# Patient Record
Sex: MALE | Race: WHITE | NOT HISPANIC OR LATINO | Employment: FULL TIME | URBAN - METROPOLITAN AREA
[De-identification: names, ages, dates, MRNs, and addresses within clinical notes are randomized per-mention and may not be internally consistent; named-entity substitution may affect disease eponyms.]

---

## 2017-09-26 ENCOUNTER — OFFICE VISIT (OUTPATIENT)
Dept: URGENT CARE | Facility: URGENT CARE | Age: 52
End: 2017-09-26
Payer: COMMERCIAL

## 2017-09-26 VITALS
WEIGHT: 228.1 LBS | BODY MASS INDEX: 30.09 KG/M2 | OXYGEN SATURATION: 98 % | TEMPERATURE: 98.2 F | DIASTOLIC BLOOD PRESSURE: 80 MMHG | SYSTOLIC BLOOD PRESSURE: 119 MMHG | HEART RATE: 56 BPM

## 2017-09-26 DIAGNOSIS — L03.90 CELLULITIS, UNSPECIFIED CELLULITIS SITE: ICD-10-CM

## 2017-09-26 DIAGNOSIS — H00.022 HORDEOLUM INTERNUM OF RIGHT LOWER EYELID: Primary | ICD-10-CM

## 2017-09-26 PROCEDURE — 99213 OFFICE O/P EST LOW 20 MIN: CPT | Performed by: PHYSICIAN ASSISTANT

## 2017-09-26 RX ORDER — CEFDINIR 300 MG/1
300 CAPSULE ORAL 2 TIMES DAILY
Qty: 14 CAPSULE | Refills: 0 | Status: SHIPPED | OUTPATIENT
Start: 2017-09-26

## 2017-09-26 RX ORDER — TOBRAMYCIN 3 MG/ML
1 SOLUTION/ DROPS OPHTHALMIC EVERY 4 HOURS
Qty: 1 BOTTLE | Refills: 0 | Status: SHIPPED | OUTPATIENT
Start: 2017-09-26 | End: 2017-10-03

## 2017-09-26 NOTE — MR AVS SNAPSHOT
After Visit Summary   9/26/2017    Jona Belle Jr.    MRN: [id number]           Patient Information     Date Of Birth          1965        Visit Information        Provider Department      9/26/2017 11:20 AM Freddy Diop PA-C [facility]        Today's Diagnoses     Hordeolum internum of right lower eyelid    -  1    Cellulitis, unspecified cellulitis site           Follow-ups after your visit        Who to contact     If you have questions or need follow up information about today's clinic visit or your schedule please contact [facility] directly at [phone].  Normal or non-critical lab and imaging results will be communicated to you by MyChart, letter or phone within 4 business days after the clinic has received the results. If you do not hear from us within 7 days, please contact the clinic through [employer] or phone. If you have a critical or abnormal lab result, we will notify you by phone as soon as possible.  Submit refill requests through [employer] or call your pharmacy and they will forward the refill request to us. Please allow 3 business days for your refill to be completed.          Additional Information About Your Visit        MyChart Information     [employer] gives you secure access to your electronic health record. If you see a primary care provider, you can also send messages to your care team and make appointments. If you have questions, please call your primary care clinic.  If you do not have a primary care provider, please call [phone] and they will assist you.        Care EveryWhere ID     This is your Care EveryWhere ID. This could be used by other organizations to access your [facility] medical records  [id number]        Your Vitals Were     Pulse Temperature Pulse Oximetry BMI (Body Mass Index)          56 98.2  F (36.8  C) (Oral) 98% 30.09 kg/m2         Blood Pressure from Last 3 Encounters:   09/26/17  Pre-procedure teaching reinforced. 119/80   12/30/16 118/62   09/20/16 123/81    Weight from Last 3 Encounters:   09/26/17 228 lb 1.6 oz (103.5 kg)   12/30/16 230 lb (104.3 kg)   09/20/16 230 lb (104.3 kg)              Today, you had the following     No orders found for display         Today's Medication Changes          These changes are accurate as of: 9/26/17 12:09 PM.  If you have any questions, ask your nurse or doctor.               Start taking these medicines.        Dose/Directions    cefdinir 300 MG capsule   Commonly known as:  OMNICEF   Used for:  Cellulitis, unspecified cellulitis site   Started by:  Freddy Diop PA-C        Dose:  300 mg   Take 1 capsule (300 mg) by mouth 2 times daily   Quantity:  14 capsule   Refills:  0       tobramycin 0.3 % ophthalmic solution   Commonly known as:  TOBREX   Used for:  Hordeolum internum of right lower eyelid   Started by:  Freddy Diop PA-C        Dose:  1 drop   Apply 1 drop to eye every 4 hours for 7 days   Quantity:  1 Bottle   Refills:  0            Where to get your medicines      [facility] [id number] [place] [address] [address] & [facility]  [address] [place] [address] [place] [address]    Hours:  24-hours Phone:  [phone]     cefdinir 300 MG capsule    tobramycin 0.3 % ophthalmic solution                Primary Care Provider Office Phone # Fax #    Sherrell Fraser [phone]       XXX RETIRED XXX [address] [facility] [address]  [facility] [address]        Equal Access to Services     [facility]: Hadii tracy sommers hadasho Sorigoberto, waaxda luqadaha, qaybta kaalmada ademarie, kari huber. So [facility] [phone].    ATENCIÓN: Si habla español, tiene a beltre disposición servicios gratuitos de asistencia lingüística. Llame al [phone].    We comply with applicable federal civil rights laws and [place] laws. We do not discriminate on the basis of race, color, national origin, age, disability sex, sexual  orientation or gender identity.            Thank you!     Thank you for choosing [facility]  for your care. Our goal is always to provide you with excellent care. Hearing back from our patients is one way we can continue to improve our services. Please take a few minutes to complete the written survey that you may receive in the mail after your visit with us. Thank you!             Your Updated Medication List - Protect others around you: Learn how to safely use, store and throw away your medicines at www.disposemymeds.org.          This list is accurate as of: 9/26/17 12:09 PM.  Always use your most recent med list.                   Brand Name Dispense Instructions for use Diagnosis    albuterol 108 (90 BASE) MCG/ACT Inhaler    PROAIR HFA/PROVENTIL HFA/VENTOLIN HFA    1 Inhaler    Inhale 2 puffs into the lungs every 6 hours as needed for wheezing    Acute bronchitis with symptoms greater than 10 days       azithromycin 250 MG tablet    ZITHROMAX    6 tablet    Two tablets first day, then one tablet daily for four days.    Acute bronchitis with symptoms greater than 10 days       benzonatate 100 MG capsule    TESSALON    42 capsule    Take 1 capsule (100 mg) by mouth 3 times daily as needed for cough    Acute bronchitis with symptoms greater than 10 days       cefdinir 300 MG capsule    OMNICEF    14 capsule    Take 1 capsule (300 mg) by mouth 2 times daily    Cellulitis, unspecified cellulitis site       cetirizine 10 MG tablet    zyrTEC    30 tablet    Take 1 tablet (10 mg) by mouth every evening    Acute bronchitis with symptoms greater than 10 days       clonazePAM 0.5 MG tablet    klonoPIN    30 tablet    Take 0.5-1 tablets (0.25-0.5 mg) by mouth daily as needed for anxiety    Anxiety       doxycycline 100 MG tablet    VIBRA-TABS    20 tablet    Take 1 tablet (100 mg) by mouth 2 times daily    Acute bronchospasm, Acute bronchitis with symptoms > 10 days       escitalopram 20 MG  tablet    LEXAPRO    30 tablet    Take 1/2 tablet (10 mg) for 1-2 weeks, then increase to 1 tablet orally daily    Anxiety       fluticasone 50 MCG/ACT spray    FLONASE    1 Package    Spray 1-2 sprays into both nostrils daily        ibuprofen 800 MG tablet    ADVIL/MOTRIN    30 tablet    Take 1 tablet (800 mg) by mouth every 8 hours as needed for moderate pain    Sprain of lumbar region, subsequent encounter       meclizine 12.5 MG tablet    ANTIVERT    30 tablet    Take 2 tablets (25 mg) by mouth 4 times daily as needed for dizziness        tobramycin 0.3 % ophthalmic solution    TOBREX    1 Bottle    Apply 1 drop to eye every 4 hours for 7 days    Hordeolum internum of right lower eyelid

## 2017-09-26 NOTE — PROGRESS NOTES
SUBJECTIVE:  Chief Complaint:   Chief Complaint   Patient presents with     Urgent Care     pt states right eye swollen, red, some itchinnes, some drainage 1x week      History of Present Illness:  Jona Belle Jr. is a 51 year old male who presents complaining of moderate right eye lower eyelid tenderenss for 1 week(s).   Onset/timing: gradual.    Associated Signs and Symptoms: right lower eyelid  Treatment measures tried include: warm moist compresses  Contact wearer : no    Past Medical History:   Diagnosis Date     Anxiety state, unspecified     Dr Santana     Depressive disorder, not elsewhere classified      Gynecomastia 6=09    mammogram     Allergies   Allergen Reactions     Keflex [Cephalexin Monohydrate] GI Disturbance     Keflex --bothered stomach      Social History     Social History     Marital status: Single     Spouse name: N/A     Number of children: 1     Years of education: 13     Occupational History     actor      play     Social History Main Topics     Smoking status: Former Smoker     Quit date: 4/2/1983     Smokeless tobacco: Former User     Alcohol use No     Drug use: No     Sexual activity: No     Other Topics Concern     Not on file     Social History Narrative         ROS:  CONSTITUTIONAL:NEGATIVE for fever, chills, change in weight  INTEGUMENTARY/SKIN: POSITIVE for right lower eyelid tenderness, localized swelling  EYES: NEGATIVE for vision changes or irritation  ENT/MOUTH: NEGATIVE for ear, mouth and throat problems  NEURO: NEGATIVE for weakness, dizziness or paresthesias      OBJECTIVE:  /80  Pulse 56  Temp 98.2  F (36.8  C) (Oral)  Wt 228 lb 1.6 oz (103.5 kg)  SpO2 98%  BMI 30.09 kg/m2  General: no acute distress  Eye exam: left eye normal lid, conjunctiva, cornea, pupil and fundus, right eye abnormal findings: right lower eyelid tenderness.  Ears: normal canals, TMs bilaterally, normal TM mobility  Nose: NORMAL - no drainage, turbinates normal in size.  Neuro: PERRLA,  EOMI  Skin: Positive for right side lower eyelid tenderness, localized swelling    ASSESSMENT/PLAN:      ICD-10-CM    1. Hordeolum internum of right lower eyelid H00.022 tobramycin (TOBREX) 0.3 % ophthalmic solution   2. Cellulitis, unspecified cellulitis site L03.90 cefdinir (OMNICEF) 300 MG capsule     Warm moist compresses  Motrin  Follow up with ophthalmology  See orders in epic

## 2017-09-26 NOTE — NURSING NOTE
"Chief Complaint   Patient presents with     Urgent Care     pt states right eye swollen, red, some itchinnes, some drainage 1x week        Initial /80  Pulse 56  Temp 98.2  F (36.8  C) (Oral)  Wt 228 lb 1.6 oz (103.5 kg)  SpO2 98%  BMI 30.09 kg/m2 Estimated body mass index is 30.09 kg/(m^2) as calculated from the following:    Height as of 12/30/16: 6' 1\" (1.854 m).    Weight as of this encounter: 228 lb 1.6 oz (103.5 kg).  Medication Reconciliation: complete      "

## 2018-02-03 ENCOUNTER — HOSPITAL ENCOUNTER (EMERGENCY)
Facility: CLINIC | Age: 53
Discharge: HOME | End: 2018-02-03
Attending: EMERGENCY MEDICINE | Admitting: EMERGENCY MEDICINE
Payer: MEDICAID

## 2018-02-03 ENCOUNTER — APPOINTMENT (OUTPATIENT)
Dept: GENERAL RADIOLOGY | Facility: CLINIC | Age: 53
End: 2018-02-03
Attending: EMERGENCY MEDICINE
Payer: MEDICAID

## 2018-02-03 VITALS
OXYGEN SATURATION: 98 % | SYSTOLIC BLOOD PRESSURE: 136 MMHG | DIASTOLIC BLOOD PRESSURE: 74 MMHG | WEIGHT: 235 LBS | BODY MASS INDEX: 31.14 KG/M2 | TEMPERATURE: 97.8 F | HEIGHT: 73 IN | RESPIRATION RATE: 20 BRPM | HEART RATE: 59 BPM

## 2018-02-03 DIAGNOSIS — K21.9 GASTROESOPHAGEAL REFLUX DISEASE, ESOPHAGITIS PRESENCE NOT SPECIFIED: ICD-10-CM

## 2018-02-03 DIAGNOSIS — R07.89 CHEST WALL PAIN: ICD-10-CM

## 2018-02-03 LAB
ANION GAP SERPL CALCULATED.3IONS-SCNC: 7 MMOL/L (ref 3–14)
BASOPHILS # BLD AUTO: 0 10E9/L (ref 0–0.2)
BASOPHILS NFR BLD AUTO: 0.8 %
BUN SERPL-MCNC: 13 MG/DL (ref 7–30)
CALCIUM SERPL-MCNC: 8.8 MG/DL (ref 8.5–10.1)
CHLORIDE SERPL-SCNC: 106 MMOL/L (ref 94–109)
CO2 SERPL-SCNC: 25 MMOL/L (ref 20–32)
CREAT SERPL-MCNC: 1.02 MG/DL (ref 0.66–1.25)
DIFFERENTIAL METHOD BLD: NORMAL
EOSINOPHIL # BLD AUTO: 0.1 10E9/L (ref 0–0.7)
EOSINOPHIL NFR BLD AUTO: 1.7 %
ERYTHROCYTE [DISTWIDTH] IN BLOOD BY AUTOMATED COUNT: 13.2 % (ref 10–15)
GFR SERPL CREATININE-BSD FRML MDRD: 76 ML/MIN/1.7M2
GLUCOSE SERPL-MCNC: 105 MG/DL (ref 70–99)
HCT VFR BLD AUTO: 45.3 % (ref 40–53)
HGB BLD-MCNC: 16.2 G/DL (ref 13.3–17.7)
IMM GRANULOCYTES # BLD: 0 10E9/L (ref 0–0.4)
IMM GRANULOCYTES NFR BLD: 0.2 %
INTERPRETATION ECG - MUSE: NORMAL
LYMPHOCYTES # BLD AUTO: 1.6 10E9/L (ref 0.8–5.3)
LYMPHOCYTES NFR BLD AUTO: 33.5 %
MCH RBC QN AUTO: 31 PG (ref 26.5–33)
MCHC RBC AUTO-ENTMCNC: 35.8 G/DL (ref 31.5–36.5)
MCV RBC AUTO: 87 FL (ref 78–100)
MONOCYTES # BLD AUTO: 0.3 10E9/L (ref 0–1.3)
MONOCYTES NFR BLD AUTO: 7 %
NEUTROPHILS # BLD AUTO: 2.7 10E9/L (ref 1.6–8.3)
NEUTROPHILS NFR BLD AUTO: 56.8 %
NRBC # BLD AUTO: 0 10*3/UL
NRBC BLD AUTO-RTO: 0 /100
PLATELET # BLD AUTO: 199 10E9/L (ref 150–450)
POTASSIUM SERPL-SCNC: 4 MMOL/L (ref 3.4–5.3)
RBC # BLD AUTO: 5.23 10E12/L (ref 4.4–5.9)
SODIUM SERPL-SCNC: 138 MMOL/L (ref 133–144)
TROPONIN I SERPL-MCNC: <0.015 UG/L (ref 0–0.04)
WBC # BLD AUTO: 4.7 10E9/L (ref 4–11)

## 2018-02-03 PROCEDURE — 85025 COMPLETE CBC W/AUTO DIFF WBC: CPT | Performed by: EMERGENCY MEDICINE

## 2018-02-03 PROCEDURE — 84484 ASSAY OF TROPONIN QUANT: CPT | Performed by: EMERGENCY MEDICINE

## 2018-02-03 PROCEDURE — 25000132 ZZH RX MED GY IP 250 OP 250 PS 637: Performed by: EMERGENCY MEDICINE

## 2018-02-03 PROCEDURE — 80048 BASIC METABOLIC PNL TOTAL CA: CPT | Performed by: EMERGENCY MEDICINE

## 2018-02-03 PROCEDURE — 71046 X-RAY EXAM CHEST 2 VIEWS: CPT

## 2018-02-03 PROCEDURE — 99285 EMERGENCY DEPT VISIT HI MDM: CPT | Mod: 25

## 2018-02-03 PROCEDURE — 25000125 ZZHC RX 250: Performed by: EMERGENCY MEDICINE

## 2018-02-03 PROCEDURE — 93005 ELECTROCARDIOGRAM TRACING: CPT

## 2018-02-03 RX ADMIN — LIDOCAINE HYDROCHLORIDE 30 ML: 20 SOLUTION ORAL; TOPICAL at 18:33

## 2018-02-03 ASSESSMENT — ENCOUNTER SYMPTOMS
COUGH: 0
FEVER: 0
DIAPHORESIS: 0
SHORTNESS OF BREATH: 0

## 2018-02-03 NOTE — ED AVS SNAPSHOT
Emergency Department    [address] [place] [address]    Phone:  [phone]    Fax:  [phone]                                       Jona Belle Jr.   MRN: [id number]    Department:   Emergency Department   Date of Visit:  2/3/2018           After Visit Summary Signature Page     I have received my discharge instructions, and my questions have been answered. I have discussed any challenges I see with this plan with the nurse or doctor.    ..........................................................................................................................................  Patient/Patient Representative Signature      ..........................................................................................................................................  Patient Representative Print Name and Relationship to Patient    ..................................................               ................................................  Date                                            Time    ..........................................................................................................................................  Reviewed by Signature/Title    ...................................................              ..............................................  Date                                                            Time

## 2018-02-03 NOTE — ED AVS SNAPSHOT
Emergency Department    [address] [facility] [address]    Phone:  [phone]    Fax:  [phone]                                       Jona Belle Jr.   MRN: [id number]    Department:   Emergency Department   Date of Visit:  2/3/2018           Patient Information     Date Of Birth          1965        Your diagnoses for this visit were:     Chest wall pain     Gastroesophageal reflux disease, esophagitis presence not specified        You were seen by Kai Meadows DO.      Follow-up Information     Follow up with Sherrell Fraser MD In 5 days.    Specialty:  Family Practice    Contact information:    XXX RETIRED XXX  [address] [id number] [phone]          Follow up with  Emergency Department.    Specialty:  EMERGENCY MEDICINE    Why:  If symptoms worsen    Contact information:    [address] [facility] [address] [phone]        Discharge Instructions         *CHEST PAIN, NONCARDIAC    Based on your visit today, the exact cause of your chest pain is not certain. Your condition does not seem serious and your pain does not appear to be coming from your heart. However, sometimes the signs of a serious problem take more time to appear. Therefore, please watch for the warning signs listed below.  HOME CARE:  1. Rest today and avoid strenuous activity.  2. Take any prescribed medicine as directed.  FOLLOW UP with your doctor in 1-3 days.   GET PROMPT MEDICAL ATTENTION if any of the following occur:    A change in the type of pain: if it feels different, becomes more severe, lasts longer, or begins to spread into your shoulder, arm, neck, jaw or back    Shortness of breath or increased pain with breathing    Cough with blood or dark colored sputum (phlegm)    Weakness, dizziness, or fainting    Fever over 101  F (38.3  C)    Swelling, pain or redness in one leg    7871-0119 [employer]. [address], [place] [address]. All rights reserved. This information is not intended as a substitute for professional medical care. Always follow your healthcare professional's instructions.  This information has been modified by your health care provider with permission from the publisher.    Return to the emergency department or seek medical care as instructed if your symptoms fail to improve or significantly worsen.    Take Acetaminophen (aka Tylenol) as needed for symptom/pain relief; use as directed.    Ice area of pain for 20 minutes four times per day for the next two days    Recommend Pepcid as needed for reflux symptoms    Follow-up as indicated on page 1.  Maintain adequate hydration and get plenty of rest.        Discharge References/Attachments     GERD (ADULT) (ENGLISH)      24 Hour Appointment Hotline       To make an appointment at any [facility] clinic, call [phone] ([phone]). If you don't have a family doctor or clinic, we will help you find one. [facility] clinics are conveniently located to serve the needs of you and your family.             Review of your medicines      Our records show that you are taking the medicines listed below. If these are incorrect, please call your family doctor or clinic.        Dose / Directions Last dose taken    albuterol 108 (90 BASE) MCG/ACT Inhaler   Commonly known as:  PROAIR HFA/PROVENTIL HFA/VENTOLIN HFA   Dose:  2 puff   Quantity:  1 Inhaler        Inhale 2 puffs into the lungs every 6 hours as needed for wheezing   Refills:  0        azithromycin 250 MG tablet   Commonly known as:  ZITHROMAX   Quantity:  6 tablet        Two tablets first day, then one tablet daily for four days.   Refills:  0        benzonatate 100 MG capsule   Commonly known as:  TESSALON   Dose:  100 mg   Quantity:  42 capsule        Take 1 capsule (100 mg) by mouth 3 times daily as needed for cough   Refills:  0        cefdinir 300 MG capsule   Commonly known as:  OMNICEF   Dose:  300  mg   Quantity:  14 capsule        Take 1 capsule (300 mg) by mouth 2 times daily   Refills:  0        cetirizine 10 MG tablet   Commonly known as:  zyrTEC   Dose:  10 mg   Quantity:  30 tablet        Take 1 tablet (10 mg) by mouth every evening   Refills:  0        clonazePAM 0.5 MG tablet   Commonly known as:  klonoPIN   Dose:  0.25-0.5 mg   Quantity:  30 tablet        Take 0.5-1 tablets (0.25-0.5 mg) by mouth daily as needed for anxiety   Refills:  1        doxycycline 100 MG tablet   Commonly known as:  VIBRA-TABS   Dose:  100 mg   Quantity:  20 tablet        Take 1 tablet (100 mg) by mouth 2 times daily   Refills:  0        escitalopram 20 MG tablet   Commonly known as:  LEXAPRO   Quantity:  30 tablet        Take 1/2 tablet (10 mg) for 1-2 weeks, then increase to 1 tablet orally daily   Refills:  1        fluticasone 50 MCG/ACT spray   Commonly known as:  FLONASE   Dose:  1-2 spray   Quantity:  1 Package        Spray 1-2 sprays into both nostrils daily   Refills:  0        ibuprofen 800 MG tablet   Commonly known as:  ADVIL/MOTRIN   Dose:  800 mg   Quantity:  30 tablet        Take 1 tablet (800 mg) by mouth every 8 hours as needed for moderate pain   Refills:  1        meclizine 12.5 MG tablet   Commonly known as:  ANTIVERT   Dose:  25 mg   Quantity:  30 tablet        Take 2 tablets (25 mg) by mouth 4 times daily as needed for dizziness   Refills:  0                Procedures and tests performed during your visit     Basic metabolic panel    CBC with platelets + differential    Chest XR,  PA & LAT    EKG 12-lead, tracing only    Troponin I      Orders Needing Specimen Collection     None      Pending Results     Date and Time Order Name Status Description    2/3/2018 1820 Chest XR,  PA & LAT Preliminary             Pending Culture Results     No orders found from 2/1/2018 to 2/4/2018.            Pending Results Instructions     If you had any lab results that were not finalized at the time of your Discharge, you  can call the ED Lab Result RN at [phone]. You will be contacted by this team for any positive Lab results or changes in treatment. The nurses are available 7 days a week from 10A to 6:30P.  You can leave a message 24 hours per day and they will return your call.        Test Results From Your Hospital Stay        2/3/2018  6:50 PM      Component Results     Component Value Ref Range & Units Status    WBC 4.7 4.0 - 11.0 10e9/L Final    RBC Count 5.23 4.4 - 5.9 10e12/L Final    Hemoglobin 16.2 13.3 - 17.7 g/dL Final    Hematocrit 45.3 40.0 - 53.0 % Final    MCV 87 78 - 100 fl Final    MCH 31.0 26.5 - 33.0 pg Final    MCHC 35.8 31.5 - 36.5 g/dL Final    RDW 13.2 10.0 - 15.0 % Final    Platelet Count 199 150 - 450 10e9/L Final    Diff Method Automated Method  Final    % Neutrophils 56.8 % Final    % Lymphocytes 33.5 % Final    % Monocytes 7.0 % Final    % Eosinophils 1.7 % Final    % Basophils 0.8 % Final    % Immature Granulocytes 0.2 % Final    Nucleated RBCs 0 0 /100 Final    Absolute Neutrophil 2.7 1.6 - 8.3 10e9/L Final    Absolute Lymphocytes 1.6 0.8 - 5.3 10e9/L Final    Absolute Monocytes 0.3 0.0 - 1.3 10e9/L Final    Absolute Eosinophils 0.1 0.0 - 0.7 10e9/L Final    Absolute Basophils 0.0 0.0 - 0.2 10e9/L Final    Abs Immature Granulocytes 0.0 0 - 0.4 10e9/L Final    Absolute Nucleated RBC 0.0  Final         2/3/2018  7:09 PM      Component Results     Component Value Ref Range & Units Status    Sodium 138 133 - 144 mmol/L Final    Potassium 4.0 3.4 - 5.3 mmol/L Final    Chloride 106 94 - 109 mmol/L Final    Carbon Dioxide 25 20 - 32 mmol/L Final    Anion Gap 7 3 - 14 mmol/L Final    Glucose 105 (H) 70 - 99 mg/dL Final    Urea Nitrogen 13 7 - 30 mg/dL Final    Creatinine 1.02 0.66 - 1.25 mg/dL Final    GFR Estimate 76 >60 mL/min/1.7m2 Final    Non  GFR Calc    GFR Estimate If Black >90 >60 mL/min/1.7m2 Final    African American GFR Calc    Calcium 8.8 8.5 - 10.1 mg/dL Final         2/3/2018   6:55 PM      Narrative     CHEST TWO VIEWS    2/3/2018 6:42 PM     HISTORY: Chest pain.     COMPARISON: 1/18/2015.        Impression     IMPRESSION: No acute cardiopulmonary disease.          2/3/2018  7:14 PM      Component Results     Component Value Ref Range & Units Status    Troponin I ES <0.015 0.000 - 0.045 ug/L Final    The 99th percentile for upper reference range is 0.045 ug/L.  Troponin values   in the range of 0.045 - 0.120 ug/L may be associated with risks of adverse   clinical events.                  Clinical Quality Measure: Blood Pressure Screening     Your blood pressure was checked while you were in the emergency department today. The last reading we obtained was  BP: 136/74 . Please read the guidelines below about what these numbers mean and what you should do about them.  If your systolic blood pressure (the top number) is less than 120 and your diastolic blood pressure (the bottom number) is less than 80, then your blood pressure is normal. There is nothing more that you need to do about it.  If your systolic blood pressure (the top number) is 120-139 or your diastolic blood pressure (the bottom number) is 80-89, your blood pressure may be higher than it should be. You should have your blood pressure rechecked within a year by a primary care provider.  If your systolic blood pressure (the top number) is 140 or greater or your diastolic blood pressure (the bottom number) is 90 or greater, you may have high blood pressure. High blood pressure is treatable, but if left untreated over time it can put you at risk for heart attack, stroke, or kidney failure. You should have your blood pressure rechecked by a primary care provider within the next 4 weeks.  If your provider in the emergency department today gave you specific instructions to follow-up with your doctor or provider even sooner than that, you should follow that instruction and not wait for up to 4 weeks for your follow-up visit.         Thank you for choosing [facility]       Thank you for choosing [facility] for your care. Our goal is always to provide you with excellent care. Hearing back from our patients is one way we can continue to improve our services. Please take a few minutes to complete the written survey that you may receive in the mail after you visit with us. Thank you!        [employer] Information     [employer] gives you secure access to your electronic health record. If you see a primary care provider, you can also send messages to your care team and make appointments. If you have questions, please call your primary care clinic.  If you do not have a primary care provider, please call [phone] and they will assist you.        Care EveryWhere ID     This is your Care EveryWhere ID. This could be used by other organizations to access your [facility] medical records  [id number]        Equal Access to Services     RODRIGO DA SILVA : Des Cardenas, jose corrigan, tani clayton, kari huber. So [facility] [phone].    ATENCIÓN: Si habla español, tiene a beltre disposición servicios gratuitos de asistencia lingüística. Llame al [phone].    We comply with applicable federal civil rights laws and [place] laws. We do not discriminate on the basis of race, color, national origin, age, disability, sex, sexual orientation, or gender identity.            After Visit Summary       This is your record. Keep this with you and show to your community pharmacist(s) and doctor(s) at your next visit.

## 2018-02-03 NOTE — ED PROVIDER NOTES
"  History     Chief Complaint:  Chest Pain      HPI   Jona Belle Jr. is a 52 year old male with a history of GERD who presents to the emergency department for evaluation of chest pain. The patient reports that he was having some slight right sided non-radiating, non-exertional burning chest discomfort five days ago and was seen in urgent care on 1/29. He was prescribed a course of Azithromycin for suspected walking pneumonia, though did not have a fever or cough and no chest XR was performed at that time. He was also prescribed a PPI for suspected GERD, though the patient did not take it as he \"does not like taking medications.\" Following this visit, the patient has continued to have intermittent chest discomfort. This persistent discomfort was concerning to him and prompted his ED visit today. He denies any recent diaphoresis, leg swelling, or shortness of breath.     Allergies:  Keflex [Cephalexin Monohydrate]    Medications:    Albuterol  Zyrtec  Meclizine  Flonase  Lexapro  Klonopin  Azithromycin    Past Medical History:    Depression  Anxiety  Gluteal tendinitis  Gynecomastia  GERD  Low back pain    Past Surgical History:    cholecystectomy    Family History:    DM    Social History:  Presents alone.   Former Smoker and former smokeless tobacco user.   Negative for alcohol use.  Marital Status:  Single [1]    Review of Systems   Constitutional: Negative for diaphoresis and fever.   Respiratory: Negative for cough and shortness of breath.    Cardiovascular: Positive for chest pain. Negative for leg swelling.   All other systems reviewed and are negative.    Physical Exam   First Vitals:  BP: 154/88  Pulse: 62  Temp: 97.8  F (36.6  C)  Resp: 20  Height: 185.4 cm (6' 1\")  Weight: 106.6 kg (235 lb)  SpO2: 100 %    Physical Exam  General: Alert and cooperative with exam. Patient in mild distress. Normal mentation.  Head:  Scalp is NC/AT  Eyes:  No scleral icterus, PERRL  ENT:  The external nose and ears are " normal. The oropharynx is normal and without erythema; mucus membranes are moist. Uvula midline, no evidence of deep space infection.  Neck:  Normal range of motion without rigidity.  CV:  Regular rate and rhythm    No pathologic murmur   Resp:  Breath sounds are clear bilaterally    Non-labored, no retractions or accessory muscle use  GI:  Abdomen is soft, no distension, no tenderness. No peritoneal signs  MS:  No lower extremity edema   Skin:  Warm and dry, No rash or lesions noted.  Neuro: Oriented x 3. No gross motor deficits.    Emergency Department Course   ECG:  Indication: Chest Pain  Time: 1733  Vent. Rate 61 bpm. AL interval 170. QRS duration 92. QT/QTc 390/392. P-R-T axis 25 43 45.  Normal sinus rhythm. Normal ECG. No significant change compared to EKG dated 1/18/2015. Read time: 1809    Imaging:  Radiographic findings were communicated with the patient who voiced understanding of the findings.    XR Chest 2 views:   No acute cardiopulmonary disease.  As per radiology.     Laboratory:  CBC: WBC: 4.7, HGB: 16.2, PLT: 199  BMP: Glucose 105 (H), o/w WNL (Creatinine: 1.02)    1830 Troponin: <0.015    Interventions:  1833 GI Cocktail (Maalox/Mylanta and viscous Lidocaine), 30 mL suspension, PO     Emergency Department Course:  Nursing notes and vitals reviewed. 1807 I performed an exam of the patient as documented above.     IV inserted. Medicine administered as documented above. Blood drawn. This was sent to the lab for further testing, results above.    The patient was sent for a Chest XR while in the emergency department, findings above.     2006 I rechecked the patient and discussed the results of his workup thus far.     Findings and plan explained to the Patient. Patient discharged home with instructions regarding supportive care, medications, and reasons to return. The importance of close follow-up was reviewed.     I personally reviewed the laboratory results with the Patient and answered all related  questions prior to discharge.     Impression & Plan    Medical Decision Making:  Jona Belle Jr. is a 52 year old male who presents with right sided chest pain, as well as intermittent reflux symptoms. The patient's history and medical records were reviewed. Consideration for, but not limited to, atypical ACS/MI, esophageal spasm/GERD, MSK pain, anxiety, among others. Labs, EKG, and imaging was obtained. EKG demonstrates normal sinus rhythm, without evidence of ischemia, infarction, or arrhthymia. Labs unremarkable as noted above. Chest XR without significant findings. The patient was provided a GI cocktail with improvement in symptoms. Pain is somewhat reproducible with palpation. At this time, no emergent cause for the patient's chest pain can be determined. PE unlikely based on history and physical exam. Recommended Pepcid for likely reflux symptoms, as well as Tylenol and ice for likely chest wall pain. I have recommended close follow up with PCP for further evaluation and care. No indication at this time for further labs or imaging. At the time of discharge, the patient was hemodynamically stable, neurologically intact, afebrile, and pain was well controlled.  Return precautions given. The patient was discharged to home.     Diagnosis:    ICD-10-CM   1. Chest wall pain R07.89   2. Gastroesophageal reflux disease, esophagitis presence not specified K21.9     Disposition:  discharged to home    Bethany BRAND, am serving as a scribe on 2/3/2018 at 6:07 PM to personally document services performed by Kai Meadows DO  based on my observations and the provider's statements to me.     Bethany Elkins  2/3/2018    EMERGENCY DEPARTMENT       Kai Meadows DO  02/04/18 [id number]

## 2018-02-04 NOTE — DISCHARGE INSTRUCTIONS
*CHEST PAIN, NONCARDIAC    Based on your visit today, the exact cause of your chest pain is not certain. Your condition does not seem serious and your pain does not appear to be coming from your heart. However, sometimes the signs of a serious problem take more time to appear. Therefore, please watch for the warning signs listed below.  HOME CARE:  1. Rest today and avoid strenuous activity.  2. Take any prescribed medicine as directed.  FOLLOW UP with your doctor in 1-3 days.   GET PROMPT MEDICAL ATTENTION if any of the following occur:    A change in the type of pain: if it feels different, becomes more severe, lasts longer, or begins to spread into your shoulder, arm, neck, jaw or back    Shortness of breath or increased pain with breathing    Cough with blood or dark colored sputum (phlegm)    Weakness, dizziness, or fainting    Fever over 101  F (38.3  C)    Swelling, pain or redness in one leg    3589-0817 [employer]. [address] [place] [address]. All rights reserved. This information is not intended as a substitute for professional medical care. Always follow your healthcare professional's instructions.  This information has been modified by your health care provider with permission from the publisher.    Return to the emergency department or seek medical care as instructed if your symptoms fail to improve or significantly worsen.    Take Acetaminophen (aka Tylenol) as needed for symptom/pain relief; use as directed.    Ice area of pain for 20 minutes four times per day for the next two days    Recommend Pepcid as needed for reflux symptoms    Follow-up as indicated on page 1.  Maintain adequate hydration and get plenty of rest.

## 2021-03-04 ENCOUNTER — OFFICE VISIT (OUTPATIENT)
Dept: URGENT CARE | Facility: URGENT CARE | Age: 56
End: 2021-03-04
Payer: COMMERCIAL

## 2021-03-04 VITALS
OXYGEN SATURATION: 98 % | DIASTOLIC BLOOD PRESSURE: 101 MMHG | RESPIRATION RATE: 17 BRPM | SYSTOLIC BLOOD PRESSURE: 152 MMHG | TEMPERATURE: 98 F

## 2021-03-04 DIAGNOSIS — K08.89 PAIN, DENTAL: ICD-10-CM

## 2021-03-04 DIAGNOSIS — J01.40 ACUTE NON-RECURRENT PANSINUSITIS: Primary | ICD-10-CM

## 2021-03-04 PROCEDURE — 99204 OFFICE O/P NEW MOD 45 MIN: CPT | Performed by: NURSE PRACTITIONER

## 2021-03-04 RX ORDER — SERTRALINE HYDROCHLORIDE 25 MG/1
25 TABLET, FILM COATED ORAL DAILY
COMMUNITY

## 2021-03-04 NOTE — PROGRESS NOTES
Chief Complaint   Patient presents with     Suspected Covid     56 yo M presents with the following complaint chest congestion, facial pain on left side sinus pressure with nasal drainage , no fever or chills. onset 1-2 weeks also complains of sore tooth on left side negative covid test T-1  tx- pt denies taking any otc     SUBJECTIVE:  Jona Belle Jr. is a 55 year old male presenting with mild chest congestion, left maxillary sinus pain, post nasal drip for 2 weeks. His left tooth has also been aching for 1 month. Negative COVID-19 test yesterday. He is a former smoker. Most bothersome is the left cheek pain - he is not sure if it is sinusitis or dental.    Past Medical History:   Diagnosis Date     Anxiety state, unspecified     Dr Santana     Depressive disorder, not elsewhere classified      Gynecomastia 6=09    mammogram     albuterol (PROAIR HFA, PROVENTIL HFA, VENTOLIN HFA) 108 (90 BASE) MCG/ACT inhaler, Inhale 2 puffs into the lungs every 6 hours as needed for wheezing  sertraline (ZOLOFT) 25 MG tablet, Take 25 mg by mouth daily  azithromycin (ZITHROMAX) 250 MG tablet, Two tablets first day, then one tablet daily for four days. (Patient not taking: Reported on 9/26/2017)  benzonatate (TESSALON) 100 MG capsule, Take 1 capsule (100 mg) by mouth 3 times daily as needed for cough (Patient not taking: Reported on 9/26/2017)  cefdinir (OMNICEF) 300 MG capsule, Take 1 capsule (300 mg) by mouth 2 times daily (Patient not taking: Reported on 3/4/2021)  cetirizine (ZYRTEC) 10 MG tablet, Take 1 tablet (10 mg) by mouth every evening (Patient not taking: Reported on 9/26/2017)  clonazePAM (KLONOPIN) 0.5 MG tablet, Take 0.5-1 tablets (0.25-0.5 mg) by mouth daily as needed for anxiety (Patient not taking: Reported on 3/4/2021)  doxycycline (VIBRA-TABS) 100 MG tablet, Take 1 tablet (100 mg) by mouth 2 times daily (Patient not taking: Reported on 9/26/2017)  escitalopram (LEXAPRO) 20 MG tablet, Take 1/2 tablet (10 mg) for  1-2 weeks, then increase to 1 tablet orally daily (Patient not taking: Reported on 2017)  fluticasone (FLONASE) 50 MCG/ACT nasal spray, Spray 1-2 sprays into both nostrils daily (Patient not taking: Reported on 2017)  ibuprofen (ADVIL,MOTRIN) 800 MG tablet, Take 1 tablet (800 mg) by mouth every 8 hours as needed for moderate pain (Patient not taking: Reported on 2017)  meclizine (ANTIVERT) 12.5 MG tablet, Take 2 tablets (25 mg) by mouth 4 times daily as needed for dizziness (Patient not taking: Reported on 2017)    No current facility-administered medications on file prior to visit.     Social History     Tobacco Use     Smoking status: Former Smoker     Quit date: 1983     Years since quittin.9     Smokeless tobacco: Former User   Substance Use Topics     Alcohol use: No     Allergies   Allergen Reactions     Keflex [Cephalexin Monohydrate] GI Disturbance     Keflex --bothered stomach      Review of Systems   Constitutional: Negative for appetite change, chills, diaphoresis, fatigue and fever.   HENT: Positive for congestion, dental problem, postnasal drip, sinus pressure and sinus pain. Negative for sore throat.    Respiratory: Negative for cough, chest tightness, shortness of breath and wheezing.    Gastrointestinal: Negative for nausea and vomiting.   Musculoskeletal: Negative for myalgias.   Skin: Negative for pallor and rash.   Neurological: Negative for dizziness, weakness, light-headedness and headaches.   Psychiatric/Behavioral: Negative for sleep disturbance.     OBJECTIVE:   BP (!) 152/101   Temp 98  F (36.7  C)   Resp 17   SpO2 98%      Physical Exam  Vitals signs reviewed.   Constitutional:       General: He is not in acute distress.     Appearance: Normal appearance. He is not ill-appearing, toxic-appearing or diaphoretic.   HENT:      Head: Normocephalic and atraumatic.      Nose: Congestion present.      Comments: Pansinus, more so left maxillary tenderness.      Mouth/Throat:      Mouth: Mucous membranes are moist.      Pharynx: Oropharynx is clear. No oropharyngeal exudate or posterior oropharyngeal erythema.      Comments: No obvious dental infection.  Cardiovascular:      Rate and Rhythm: Normal rate.   Pulmonary:      Effort: Pulmonary effort is normal. No respiratory distress.      Breath sounds: Normal breath sounds. No stridor. No wheezing, rhonchi or rales.   Skin:     General: Skin is warm and dry.   Neurological:      General: No focal deficit present.      Mental Status: He is alert and oriented to person, place, and time.   Psychiatric:         Mood and Affect: Mood normal.         Behavior: Behavior normal.       ASSESSMENT:    ICD-10-CM    1. Acute non-recurrent pansinusitis  J01.40 amoxicillin-clavulanate (AUGMENTIN) 875-125 MG tablet   2. Pain, dental  K08.89      PLAN:   Patient Instructions   Augmentin for pansinusitis and possible dental infection  White coat hypertension - monitor when not in clinic  Nasal congestion often starts clear then turns yellow or green towards the end- this is not a sign of a bacterial infection.  Flonase (fluticasone) 2 sprays in each nostril daily until symptoms resolve, then continue 1 spray in each nostril for at least 5 more days.  Take Tylenol or an NSAID such as ibuprofen or naproxen as needed for pain.  May use netti pot with bottled or distilled water and saline packets to flush sinuses.  Afrin (oxymetazoline) nasal spray twice daily for 3 days. Stop after 3 days.  Mucinex (guiafenesin) thins mucus and may help it to loosen more quickly  Saline drops or nasal sprays may loosen mucus.  Sit in the bathroom with the door closed and hot shower running to loosen mucus.  Contact primary care clinic if you do not have any relief from your symptoms after 10 days.  Present to emergency room for significantly increasing pain, persistent high fever >102F, swelling/redness around your eyes, changes in your vision or ability to  move your eyes, altered mental status or a severe headache.    It is important that you make an appointment with a dentist.  Take antibiotic as directed.  Brush teeth twice daily  Salt water gargles- mix about 8 oz of water with about 1 tsp of salt. Swish and spit.  Take Tylenol or an NSAID such as ibuprofen or naproxen as needed for pain.  May take up to 1000 mg of Tylenol every 6-8 hours- do not exceed 4000 mg in 24 hours.  May take up to 600 mg ibuprofen every 6-8 hours.  Alternate Tylenol and Ibuprofen every 3-4 hours.  Please follow up with primary care provider if not improving, worsening or new symptoms or for any adverse reactions to medications.     Some patients with mild infection may also receive oral therapy initially, pending dental or surgical evaluation. The preferred oral regimen is amoxicillin-clavulanate (875 mg orally twice daily). For penicillin-allergic patients, we usually give clindamycin 300 mg to 450 mg orally three times daily. Antibiotics should be continued until local inflammation has resolved completely, typically for a total of 7 to 14 days (UpToDate, 2019).      Follow up with primary care provider with any problems, questions or concerns or if symptoms worsen or fail to improve. Patient agreed to plan and verbalized understanding.    KRAIG Lakhani-BC  [facility]

## 2021-03-04 NOTE — PATIENT INSTRUCTIONS
Augmentin for pansinusitis and possible dental infection  White coat hypertension - monitor when not in clinic  Nasal congestion often starts clear then turns yellow or green towards the end- this is not a sign of a bacterial infection.  Flonase (fluticasone) 2 sprays in each nostril daily until symptoms resolve, then continue 1 spray in each nostril for at least 5 more days.  Take Tylenol or an NSAID such as ibuprofen or naproxen as needed for pain.  May use netti pot with bottled or distilled water and saline packets to flush sinuses.  Afrin (oxymetazoline) nasal spray twice daily for 3 days. Stop after 3 days.  Mucinex (guiafenesin) thins mucus and may help it to loosen more quickly  Saline drops or nasal sprays may loosen mucus.  Sit in the bathroom with the door closed and hot shower running to loosen mucus.  Contact primary care clinic if you do not have any relief from your symptoms after 10 days.  Present to emergency room for significantly increasing pain, persistent high fever >102F, swelling/redness around your eyes, changes in your vision or ability to move your eyes, altered mental status or a severe headache.    It is important that you make an appointment with a dentist.  Take antibiotic as directed.  Brush teeth twice daily  Salt water gargles- mix about 8 oz of water with about 1 tsp of salt. Swish and spit.  Take Tylenol or an NSAID such as ibuprofen or naproxen as needed for pain.  May take up to 1000 mg of Tylenol every 6-8 hours- do not exceed 4000 mg in 24 hours.  May take up to 600 mg ibuprofen every 6-8 hours.  Alternate Tylenol and Ibuprofen every 3-4 hours.  Please follow up with primary care provider if not improving, worsening or new symptoms or for any adverse reactions to medications.     Some patients with mild infection may also receive oral therapy initially, pending dental or surgical evaluation. The preferred oral regimen is amoxicillin-clavulanate (875 mg orally twice daily). For  penicillin-allergic patients, we usually give clindamycin 300 mg to 450 mg orally three times daily. Antibiotics should be continued until local inflammation has resolved completely, typically for a total of 7 to 14 days (UpToDate, 2019).

## 2021-03-05 ASSESSMENT — ENCOUNTER SYMPTOMS
COUGH: 0
MYALGIAS: 0
SINUS PRESSURE: 1
VOMITING: 0
CHEST TIGHTNESS: 0
LIGHT-HEADEDNESS: 0
WHEEZING: 0
SLEEP DISTURBANCE: 0
DIZZINESS: 0
FEVER: 0
APPETITE CHANGE: 0
NAUSEA: 0
WEAKNESS: 0
CHILLS: 0
HEADACHES: 0
SHORTNESS OF BREATH: 0
DIAPHORESIS: 0
SINUS PAIN: 1
FATIGUE: 0
SORE THROAT: 0

## 2023-05-23 ENCOUNTER — MEDICAL CORRESPONDENCE (OUTPATIENT)
Dept: HEALTH INFORMATION MANAGEMENT | Facility: CLINIC | Age: 58
End: 2023-05-23
Payer: COMMERCIAL

## 2023-05-23 ENCOUNTER — TELEPHONE (OUTPATIENT)
Dept: MULTI SPECIALTY CLINIC | Facility: CLINIC | Age: 58
End: 2023-05-23

## 2023-05-23 NOTE — TELEPHONE ENCOUNTER
Diabetes Education Scheduling Outreach #1:    Call to patient to schedule. Left message with phone number to call to schedule.    Plan for 2nd outreach attempt within 2 business days.    Alissa Coronado OnCall  Diabetes and Nutrition Scheduling

## 2023-06-06 ENCOUNTER — TRANSCRIBE ORDERS (OUTPATIENT)
Dept: OTHER | Age: 58
End: 2023-06-06

## 2023-06-06 DIAGNOSIS — S83.232A COMPLEX TEAR OF MEDIAL MENISCUS OF LEFT KNEE: Primary | ICD-10-CM

## 2023-06-07 ENCOUNTER — THERAPY VISIT (OUTPATIENT)
Dept: PHYSICAL THERAPY | Facility: CLINIC | Age: 58
End: 2023-06-07
Payer: COMMERCIAL

## 2023-06-07 DIAGNOSIS — S83.282A TEAR OF LATERAL CARTILAGE OR MENISCUS OF KNEE, CURRENT, LEFT, INITIAL ENCOUNTER: ICD-10-CM

## 2023-06-07 DIAGNOSIS — M25.562 ACUTE PAIN OF LEFT KNEE: ICD-10-CM

## 2023-06-07 PROCEDURE — 97161 PT EVAL LOW COMPLEX 20 MIN: CPT | Mod: GP | Performed by: PHYSICAL THERAPIST

## 2023-06-07 PROCEDURE — 97110 THERAPEUTIC EXERCISES: CPT | Mod: GP | Performed by: PHYSICAL THERAPIST

## 2023-06-07 ASSESSMENT — ACTIVITIES OF DAILY LIVING (ADL)
SWELLING: I DO NOT HAVE THE SYMPTOM
WALK: ACTIVITY IS FAIRLY DIFFICULT
KNEEL ON THE FRONT OF YOUR KNEE: ACTIVITY IS VERY DIFFICULT
KNEEL ON THE FRONT OF YOUR KNEE: ACTIVITY IS VERY DIFFICULT
AS_A_RESULT_OF_YOUR_KNEE_INJURY,_HOW_WOULD_YOU_RATE_YOUR_CURRENT_LEVEL_OF_DAILY_ACTIVITY?: ABNORMAL
HOW_WOULD_YOU_RATE_THE_CURRENT_FUNCTION_OF_YOUR_KNEE_DURING_YOUR_USUAL_DAILY_ACTIVITIES_ON_A_SCALE_FROM_0_TO_100_WITH_100_BEING_YOUR_LEVEL_OF_KNEE_FUNCTION_PRIOR_TO_YOUR_INJURY_AND_0_BEING_THE_INABILITY_TO_PERFORM_ANY_OF_YOUR_USUAL_DAILY_ACTIVITIES?: 50
SIT WITH YOUR KNEE BENT: ACTIVITY IS MINIMALLY DIFFICULT
KNEE_ACTIVITY_OF_DAILY_LIVING_SCORE: 65.71
WALK: ACTIVITY IS FAIRLY DIFFICULT
SWELLING: I DO NOT HAVE THE SYMPTOM
RISE FROM A CHAIR: ACTIVITY IS MINIMALLY DIFFICULT
KNEE_ACTIVITY_OF_DAILY_LIVING_SUM: 46
GO DOWN STAIRS: ACTIVITY IS NOT DIFFICULT
WEAKNESS: I DO NOT HAVE THE SYMPTOM
WEAKNESS: I DO NOT HAVE THE SYMPTOM
LIMPING: THE SYMPTOM AFFECTS MY ACTIVITY SEVERELY
RAW_SCORE: 46
LIMPING: THE SYMPTOM AFFECTS MY ACTIVITY SEVERELY
HOW_WOULD_YOU_RATE_THE_OVERALL_FUNCTION_OF_YOUR_KNEE_DURING_YOUR_USUAL_DAILY_ACTIVITIES?: SEVERELY ABNORMAL
HOW_WOULD_YOU_RATE_THE_CURRENT_FUNCTION_OF_YOUR_KNEE_DURING_YOUR_USUAL_DAILY_ACTIVITIES_ON_A_SCALE_FROM_0_TO_100_WITH_100_BEING_YOUR_LEVEL_OF_KNEE_FUNCTION_PRIOR_TO_YOUR_INJURY_AND_0_BEING_THE_INABILITY_TO_PERFORM_ANY_OF_YOUR_USUAL_DAILY_ACTIVITIES?: 50
STIFFNESS: THE SYMPTOM AFFECTS MY ACTIVITY SLIGHTLY
SQUAT: ACTIVITY IS SOMEWHAT DIFFICULT
PAIN: THE SYMPTOM AFFECTS MY ACTIVITY SEVERELY
PAIN: THE SYMPTOM AFFECTS MY ACTIVITY SEVERELY
STAND: ACTIVITY IS SOMEWHAT DIFFICULT
STIFFNESS: THE SYMPTOM AFFECTS MY ACTIVITY SLIGHTLY
RISE FROM A CHAIR: ACTIVITY IS MINIMALLY DIFFICULT
GO UP STAIRS: ACTIVITY IS NOT DIFFICULT
AS_A_RESULT_OF_YOUR_KNEE_INJURY,_HOW_WOULD_YOU_RATE_YOUR_CURRENT_LEVEL_OF_DAILY_ACTIVITY?: ABNORMAL
HOW_WOULD_YOU_RATE_THE_OVERALL_FUNCTION_OF_YOUR_KNEE_DURING_YOUR_USUAL_DAILY_ACTIVITIES?: SEVERELY ABNORMAL
GO UP STAIRS: ACTIVITY IS NOT DIFFICULT
GIVING WAY, BUCKLING OR SHIFTING OF KNEE: I HAVE THE SYMPTOM BUT IT DOES NOT AFFECT MY ACTIVITY
STAND: ACTIVITY IS SOMEWHAT DIFFICULT
SQUAT: ACTIVITY IS SOMEWHAT DIFFICULT
PLEASE_INDICATE_YOR_PRIMARY_REASON_FOR_REFERRAL_TO_THERAPY:: KNEE
GIVING WAY, BUCKLING OR SHIFTING OF KNEE: I HAVE THE SYMPTOM BUT IT DOES NOT AFFECT MY ACTIVITY
SIT WITH YOUR KNEE BENT: ACTIVITY IS MINIMALLY DIFFICULT
GO DOWN STAIRS: ACTIVITY IS NOT DIFFICULT

## 2023-06-07 NOTE — PROGRESS NOTES
PHYSICAL THERAPY EVALUATION  Type of Visit: Evaluation    See electronic medical record for Abuse and Falls Screening details.    Subjective      Presenting condition or subjective complaint: Left knee  Patient was sitting on the floor and went to get up and had pain.  Severe pain initially.  Was using crutches but improved enough and not using them anymore.  Now feeling like there is something moving inside the knee.   Just found out that he has diabetes from a pre op appointment.  Awaiting surgery but delayed due to blood sugar level.  Knee slowly getting better.  Walking different right now due to pain.   Date of onset: 05/17/23    Relevant medical history: Depression; Diabetes; Hearing problems; Numbness or tingling in perianal area   Dates & types of surgery: Gall bladder    Prior diagnostic imaging/testing results: MRI; X-ray     Prior therapy history for the same diagnosis, illness or injury: No      Prior Level of Function   Transfers: Independent  Ambulation: Independent  ADL: Independent  IADL: Driving, Finances, Housekeeping, Laundry, Meal preparation, Medication management, School, Work, Yard work    Living Environment  Social support: Alone   Type of home: Apartment/condo   Stairs to enter the home: Yes 10 Is there a railing: Yes   Stairs inside the home: No       Help at home: None  Equipment owned: Crutches      Employment: Not Applicable    Hobbies/Interests: Hockey, hunting, biking, sports, theater    Patient goals for therapy: Less knee pain and to get more active again.     Objective   KNEE EVALUATION  PAIN: Pain is Exacerbated By: walking, stairs, standing, kneeling, squatting.  Pain is Relieved By: rest.  Pain at rest 2/10.  Worst in the last couple days 5/10.   INTEGUMENTARY (edema, incisions): Slight left knee swelling  POSTURE:   GAIT:  Slight antalgic gait on left.  Weightbearing Status:   Assistive Device(s):   Gait Deviations:   Balance/Proprioception:   Weight Bearing Alignment:  "  Non-Weight Bearing Alignment:    ROM:   (Degrees) Left AROM Left PROM  Right AROM Right PROM   Knee Flexion WNL      Knee Extension Lacking 4 degrees  0    Pain:   End feel:     Strength:   GLute medius left 4+/5, gluteus max bilateral 4+/5.  Step down lateral 6\" fair control.  Pain: - none + mild ++ moderate +++ severe  Strength Scale: 0-5/5 Left Right   Knee Flexion 4+ 5   Knee Extension 4+ 5   Quad Set Delayed      FLEXIBILITY: Decreased quadriceps L, Decreased hamstrings L, Decreased gastroc L, Decreased soleus L  Special Tests:   FUNCTIONAL TESTS:   PALPATION:   JOINT MOBILITY:       Assessment & Plan   CLINICAL IMPRESSIONS   Medical Diagnosis: Left Knee Meniscus Tear    Treatment Diagnosis: Left Knee Pain   Impression/Assessment: Patient is a 57 year old male with left knee complaints.  The following significant findings have been identified: Pain, Decreased ROM/flexibility, Decreased joint mobility and Decreased strength. These impairments interfere with their ability to perform recreational activities, household chores, driving , household mobility and community mobility as compared to previous level of function.     Clinical Decision Making (Complexity):   Clinical Presentation: Stable/Uncomplicated  Clinical Presentation Rationale: based on medical and personal factors listed in PT evaluation  Clinical Decision Making (Complexity): Low complexity    PLAN OF CARE  Treatment Interventions:  Modalities: Cryotherapy  Interventions: Gait Training, Manual Therapy, Neuromuscular Re-education, Therapeutic Activity, Therapeutic Exercise, Self-Care/Home Management    Long Term Goals     PT Goal 1  Goal Identifier: Stairs  Goal Description: Amb up/down 12 stairs reciprocally without pain  Rationale: to maximize safety and independence within the home;to maximize safety and independence within the community  Target Date: 08/02/23  PT Goal 2  Goal Identifier: Amb  Goal Description: Ambulate 60 min without " pain  Rationale: to maximize safety and independence within the community  Target Date: 08/02/23      Frequency of Treatment: 1 time per week  Duration of Treatment: 8 weeks    Recommended Referrals to Other Professionals:   Education Assessment:        Risks and benefits of evaluation/treatment have been explained.   Patient/Family/caregiver agrees with Plan of Care.     Evaluation Time:            Signing Clinician: PARAM Sadler [facility]                                                                                   OUTPATIENT PHYSICAL THERAPY      PLAN OF TREATMENT FOR OUTPATIENT REHABILITATION   Patient's Last Name, First Name, Jona Rivera YOB: 1965   Provider's Name   [facility]   Medical Record No.  [id number]     Onset Date: 05/17/23  Start of Care Date: 06/07/23     Medical Diagnosis:  Left Knee Meniscus Tear      PT Treatment Diagnosis:  Left Knee Pain Plan of Treatment  Frequency/Duration: 1 time per week/ 8 weeks    Certification date from 06/07/23 to 08/02/23         See note for plan of treatment details and functional goals     Tomasz Joya PT                         I CERTIFY THE NEED FOR THESE SERVICES FURNISHED UNDER        THIS PLAN OF TREATMENT AND WHILE UNDER MY CARE .             Physician Signature               Date    X_____________________________________________________                        Referring Provider:  Luis Cummins      Initial Assessment  See Epic Evaluation- Start of Care Date: 06/07/23

## 2023-06-19 ENCOUNTER — THERAPY VISIT (OUTPATIENT)
Dept: PHYSICAL THERAPY | Facility: CLINIC | Age: 58
End: 2023-06-19
Payer: COMMERCIAL

## 2023-06-19 DIAGNOSIS — M25.562 ACUTE PAIN OF LEFT KNEE: Primary | ICD-10-CM

## 2023-06-19 DIAGNOSIS — S83.282A TEAR OF LATERAL CARTILAGE OR MENISCUS OF KNEE, CURRENT, LEFT, INITIAL ENCOUNTER: ICD-10-CM

## 2023-06-19 PROCEDURE — 97110 THERAPEUTIC EXERCISES: CPT | Mod: GP | Performed by: PHYSICAL THERAPIST

## 2023-06-26 ENCOUNTER — THERAPY VISIT (OUTPATIENT)
Dept: PHYSICAL THERAPY | Facility: CLINIC | Age: 58
End: 2023-06-26
Payer: COMMERCIAL

## 2023-06-26 DIAGNOSIS — S83.282A TEAR OF LATERAL CARTILAGE OR MENISCUS OF KNEE, CURRENT, LEFT, INITIAL ENCOUNTER: ICD-10-CM

## 2023-06-26 DIAGNOSIS — M25.562 ACUTE PAIN OF LEFT KNEE: Primary | ICD-10-CM

## 2023-06-26 PROCEDURE — 97110 THERAPEUTIC EXERCISES: CPT | Mod: GP | Performed by: PHYSICAL THERAPIST

## 2023-06-28 ENCOUNTER — ALLIED HEALTH/NURSE VISIT (OUTPATIENT)
Dept: EDUCATION SERVICES | Facility: CLINIC | Age: 58
End: 2023-06-28
Payer: COMMERCIAL

## 2023-06-28 DIAGNOSIS — E11.9 TYPE 2 DIABETES MELLITUS (H): Primary | ICD-10-CM

## 2023-06-28 PROCEDURE — G0108 DIAB MANAGE TRN  PER INDIV: HCPCS

## 2023-06-28 NOTE — PATIENT INSTRUCTIONS
Reduce portion size of all meals.   Check bg once a day alternating between fasting and 2 hrs post meal.  Omit sugary beverages from diet.         Najma Weaver, RN, BSN, CDCES   Certified Diabetes Care &   [facility]

## 2023-06-28 NOTE — PROGRESS NOTES
Diabetes Self-Management Education & Support    Presents for: Initial Assessment for new diagnosis    Type of Service: In Person Visit    Assessment Type:   ASSESSMENT:  Pt referred to clinic by Pcp for new onset diabetes self mgmt education. Reviewed pathohysiology of diabetes, how to check bg and basic nutrition concepts.    Pt presented to clinic with an Accu-Chek Guide me meter with incorrect strips and lancets. Advised to contact Pcp to have Rx for strips be changed from Smart View to Guide and lancets changed from Fastclix to Softclix.     Pt taught how to check bg with clinic meter and lancing device. B, 1.5 hrs post lunch. Had a banana for lunch.     Patient's most recent A1c was 10.5.     Diabetes knowledge and skills assessment:   Patient is knowledgeable in diabetes management concepts related to: Needs new-onset education.     Continue education with the following diabetes management concepts: Healthy Eating, Being Active, Monitoring and Taking Medication    Based on learning assessment above, most appropriate setting for further diabetes education would be: Individual setting.      PLAN  - Check bg once a day alternating between fasting and 2 hrs post meal. Fasting bg target: . 2 hr post meal target: .   - Omit beverages with sugar from diet.   - Reduce portion size of each meal.     Topics to cover at upcoming visits: Healthy Eating, Being Active, Monitoring and Taking Medication    Follow-up: Rtc in 5-6 weeks.     See Care Plan for co-developed, patient-state behavior change goals.  AVS provided for patient today.    Education Materials Provided:  [employer] Understanding Diabetes Booklet      SUBJECTIVE/OBJECTIVE:  Presents for: Initial Assessment for new diagnosis  Accompanied by: Self  Diabetes education in the past 24mo: No  Focus of Visit: Patient Unsure  Diabetes type: Type 2  Date of diagnosis: 2023  Disease course: Stable  How confident are you filling out medical  "forms by yourself:: Not Assessed  Diabetes management related comments/concerns: Improvig bg levels.  Transportation concerns: No  Difficulty affording diabetes medication?: No  Difficulty affording diabetes testing supplies?: No  Other concerns:: None  Cultural Influences/Ethnic Background:  Not  or       Diabetes Symptoms & Complications:  Fatigue: No  Neuropathy: No  Polydipsia: No  Polyphagia: No  Polyuria: No  Visual change: No  Slow healing wounds: No  Other: No  Complications assessed today?: No    Patient Problem List and Family Medical History reviewed for relevant medical history, current medical status, and diabetes risk factors.    Vitals:  There were no vitals taken for this visit.  Estimated body mass index is 31 kg/m  as calculated from the following:    Height as of 2/3/18: 1.854 m (6' 1\").    Weight as of 2/3/18: 106.6 kg (235 lb).   Last 3 BP:   BP Readings from Last 3 Encounters:   03/04/21 (!) 152/101   02/03/18 136/74   09/26/17 119/80       History   Smoking Status     Former     Quit date: 4/2/1983   Smokeless Tobacco     Former       Labs:  Lab Results   Component Value Date    A1C 5.2 09/15/2014     Lab Results   Component Value Date     02/03/2018     Lab Results   Component Value Date     06/30/2014     HDL Cholesterol   Date Value Ref Range Status   06/30/2014 38 (L) >40 mg/dL Final   ]  GFR Estimate   Date Value Ref Range Status   02/03/2018 76 >60 mL/min/1.7m2 Final     Comment:     Non  GFR Calc     GFR Estimate If Black   Date Value Ref Range Status   02/03/2018 >90 >60 mL/min/1.7m2 Final     Comment:      GFR Calc     Lab Results   Component Value Date    CR 1.02 02/03/2018     No results found for: MICROALBUMIN    Healthy Eating:  Healthy Eating Assessed Today: Yes (Did not do a full food review due to patient having a lot of questions regarding what he can and cannot eat. Advised to reduce portion size of all meals and " omit beverages with sugar to begin with).  Cultural/[employer]din diet restrictions?: No  Has patient met with a dietitian in the past?: No    Being Active:  Being Active Assessed Today: No    Monitoring:  Monitoring Assessed Today: Yes  Did patient bring glucose meter to appointment? : Yes  Blood Glucose Meter: Accu-chek (Pt brought meter and supples to appt that were prescribed by Pcp. Test strips and lancets prescribed were not correct ones for the meter prescribed. Needs Accu-Chek Guide strips and Softclix lancets.)  Times checking blood sugar at home (number): 1 (Will be asked to check bg once a day.)  Times checking blood sugar at home (per): Day    Taking Medications:  Taking Medication Assessed Today: Yes  Current Treatments: Oral Medication (taken by mouth) (Currently taking Metformin 500mg twice a day.)  Problems taking diabetes medications regularly?: No  Diabetes medication side effects?: No    Problem Solving:  Problem Solving Assessed Today: No  Is the patient at risk for hypoglycemia?: No  Is the patient at risk for DKA?: No    Reducing Risks:  Reducing Risks Assessed Today: No  Diabetes Risks: Age over 45 years  CAD Risks: Diabetes Mellitus, Male sex    Healthy Coping:  Healthy Coping Assessed Today: No  Emotional response to diabetes: Ready to learn  Informal Support system:: None  Stage of change: CONTEMPLATION (Considering change and yet undecided)  Support resources: None    Patient Activation Measure Survey Score:      4/28/2011     1:00 PM   ROSMERY Score (Last Two)   ROSMERY Raw Score 52   Activation Score 100   ROSMERY Level 4       Care Plan and Education Provided:  See above.     Time Spent: 70 minutes  Encounter Type: Individual    Any diabetes medication dose changes were made via the CDE Protocol per the patient's primary care provider. A copy of this encounter was shared with the provider.    Najma Weaver, RN, BSN, [facility]   Certified Diabetes Care &   [facility]  and [facility]

## 2023-06-28 NOTE — LETTER
2023         RE: Jona Kahnlinda Berger.  [address]  [place] [address]        Dear Colleague,    Thank you for referring your patient, Jona Belle Jr., to the [facility]. Please see a copy of my visit note below.    Diabetes Self-Management Education & Support    Presents for: Initial Assessment for new diagnosis    Type of Service: In Person Visit    Assessment Type:   ASSESSMENT:  Pt referred to clinic by Pcp for new onset diabetes self mgmt education. Reviewed pathohysiology of diabetes, how to check bg and basic nutrition concepts.    Pt presented to clinic with an Accu-Chek Guide me meter with incorrect strips and lancets. Advised to contact Pcp to have Rx for strips be changed from Smart View to Guide and lancets changed from Fastclix to Softclix.     Pt taught how to check bg with clinic meter and lancing device. B, 1.5 hrs post lunch. Had a banana for lunch.     Patient's most recent A1c was 10.5.     Diabetes knowledge and skills assessment:   Patient is knowledgeable in diabetes management concepts related to: Needs new-onset education.     Continue education with the following diabetes management concepts: Healthy Eating, Being Active, Monitoring and Taking Medication    Based on learning assessment above, most appropriate setting for further diabetes education would be: Individual setting.      PLAN  - Check bg once a day alternating between fasting and 2 hrs post meal. Fasting bg target: . 2 hr post meal target: .   - Omit beverages with sugar from diet.   - Reduce portion size of each meal.     Topics to cover at upcoming visits: Healthy Eating, Being Active, Monitoring and Taking Medication    Follow-up: Rtc in 5-6 weeks.     See Care Plan for co-developed, patient-state behavior change goals.  AVS provided for patient today.    Education Materials Provided:  [facility] Understanding Diabetes  "Booklet      SUBJECTIVE/OBJECTIVE:  Presents for: Initial Assessment for new diagnosis  Accompanied by: Self  Diabetes education in the past 24mo: No  Focus of Visit: Patient Unsure  Diabetes type: Type 2  Date of diagnosis: 05/2023  Disease course: Stable  How confident are you filling out medical forms by yourself:: Not Assessed  Diabetes management related comments/concerns: Improvig bg levels.  Transportation concerns: No  Difficulty affording diabetes medication?: No  Difficulty affording diabetes testing supplies?: No  Other concerns:: None  Cultural Influences/Ethnic Background:  Not  or       Diabetes Symptoms & Complications:  Fatigue: No  Neuropathy: No  Polydipsia: No  Polyphagia: No  Polyuria: No  Visual change: No  Slow healing wounds: No  Other: No  Complications assessed today?: No    Patient Problem List and Family Medical History reviewed for relevant medical history, current medical status, and diabetes risk factors.    Vitals:  There were no vitals taken for this visit.  Estimated body mass index is 31 kg/m  as calculated from the following:    Height as of 2/3/18: 1.854 m (6' 1\").    Weight as of 2/3/18: 106.6 kg (235 lb).   Last 3 BP:   BP Readings from Last 3 Encounters:   03/04/21 (!) 152/101   02/03/18 136/74   09/26/17 119/80       History   Smoking Status     Former     Quit date: 4/2/1983   Smokeless Tobacco     Former       Labs:  Lab Results   Component Value Date    A1C 5.2 09/15/2014     Lab Results   Component Value Date     02/03/2018     Lab Results   Component Value Date     06/30/2014     HDL Cholesterol   Date Value Ref Range Status   06/30/2014 38 (L) >40 mg/dL Final   ]  GFR Estimate   Date Value Ref Range Status   02/03/2018 76 >60 mL/min/1.7m2 Final     Comment:     Non  GFR Calc     GFR Estimate If Black   Date Value Ref Range Status   02/03/2018 >90 >60 mL/min/1.7m2 Final     Comment:      GFR Calc     Lab Results "   Component Value Date    CR 1.02 02/03/2018     No results found for: MICROALBUMIN    Healthy Eating:  Healthy Eating Assessed Today: Yes (Did not do a full food review due to patient having a lot of questions regarding what he can and cannot eat. Advised to reduce portion size of all meals and omit beverages with sugar to begin with).  Cultural/Episcopal diet restrictions?: No  Has patient met with a dietitian in the past?: No    Being Active:  Being Active Assessed Today: No    Monitoring:  Monitoring Assessed Today: Yes  Did patient bring glucose meter to appointment? : Yes  Blood Glucose Meter: Accu-chek (Pt brought meter and supples to appt that were prescribed by Pcp. Test strips and lancets prescribed were not correct ones for the meter prescribed. Needs Accu-Chek Guide strips and Softclix lancets.)  Times checking blood sugar at home (number): 1 (Will be asked to check bg once a day.)  Times checking blood sugar at home (per): Day    Taking Medications:  Taking Medication Assessed Today: Yes  Current Treatments: Oral Medication (taken by mouth) (Currently taking Metformin 500mg twice a day.)  Problems taking diabetes medications regularly?: No  Diabetes medication side effects?: No    Problem Solving:  Problem Solving Assessed Today: No  Is the patient at risk for hypoglycemia?: No  Is the patient at risk for DKA?: No    Reducing Risks:  Reducing Risks Assessed Today: No  Diabetes Risks: Age over 45 years  CAD Risks: Diabetes Mellitus, Male sex    Healthy Coping:  Healthy Coping Assessed Today: No  Emotional response to diabetes: Ready to learn  Informal Support system:: None  Stage of change: CONTEMPLATION (Considering change and yet undecided)  Support resources: None    Patient Activation Measure Survey Score:      4/28/2011     1:00 PM   ROSMERY Score (Last Two)   ROSMERY Raw Score 52   Activation Score 100   ROSMERY Level 4       Care Plan and Education Provided:  See above.     Time Spent: 70 minutes  Encounter  Type: Individual    Any diabetes medication dose changes were made via the CDE Protocol per the patient's primary care provider. A copy of this encounter was shared with the provider.    Najma Weaver RN, BSN, [facility]   Certified Diabetes Care &   [facility]

## 2023-06-29 ENCOUNTER — TELEPHONE (OUTPATIENT)
Dept: EDUCATION SERVICES | Facility: OTHER | Age: 58
End: 2023-06-29
Payer: COMMERCIAL

## 2023-06-29 DIAGNOSIS — E11.9 TYPE 2 DIABETES MELLITUS (H): Primary | ICD-10-CM

## 2023-06-29 NOTE — TELEPHONE ENCOUNTER
Pt called triage line as he wasn't able to get enough blood on testing strip and says now error message won't clear.      Called pt back and he figured it out.  Wasn't filling canal on strip.    Also asking about testing supplies.  Called [facility] and confirmed these are ready to  and sent [employer] message.    Gaby Ayers RD [facility]  [phone]

## 2024-03-11 PROBLEM — S83.282A TEAR OF LATERAL CARTILAGE OR MENISCUS OF KNEE, CURRENT, LEFT, INITIAL ENCOUNTER: Status: RESOLVED | Noted: 2023-06-07 | Resolved: 2024-03-11

## 2024-03-11 PROBLEM — M25.562 LEFT KNEE PAIN: Status: RESOLVED | Noted: 2023-06-07 | Resolved: 2024-03-11

## 2024-03-11 NOTE — PROGRESS NOTES
06/26/23 0500   Appointment Info   Signing clinician's name / credentials da brannonar PT [facility]   Total/Authorized Visits 8   Visits Used 2   Medical Diagnosis Left Knee Meniscus Tear   PT Tx Diagnosis Left Knee Pain   Quick Adds Certification   Progress Note/Certification   Start of Care Date 06/07/23   Onset of illness/injury or Date of Surgery 05/17/23   Therapy Frequency 1 time per week   Predicted Duration 8 weeks   Certification date from 06/07/23   Certification date to 08/02/23   GOALS   PT Goals 2   PT Goal 1   Goal Identifier Stairs   Goal Description Amb up/down 12 stairs reciprocally without pain   Rationale to maximize safety and independence within the home;to maximize safety and independence within the community   Goal Progress able to negotiate 1 flight in reciprical pattern with 2/10 knee pain   Target Date 08/02/23   PT Goal 2   Goal Identifier Amb   Goal Description Ambulate 60 min without pain   Rationale to maximize safety and independence within the community   Target Date 08/02/23   Subjective Report   Subjective Report knee is feeling better, now 0-2-3/10.  worse with amb on uneven surfaces.   Objective Measures   Objective Measures Objective Measure 1   Objective Measure 1   Objective Measure added leg press, side step with theraband and table squats with good tolerance.  Can do 6 in step back on right, limited to 4 in on left due to knee pain and loss of control   Treatment Interventions (PT)   Interventions Therapeutic Procedure/Exercise   Therapeutic Procedure/Exercise   Therapeutic Procedures: strength, endurance, ROM, flexibility minutes (74064) 38   Ther Proc 1 - Details Sta bike SH 7 6 min warm up   PTRx Ther Proc 1 Piriformis Stretch Above 90 Degress Supine   PTRx Ther Proc 1 - Details Hold 20-30 sec seconds   PTRx Ther Proc 2 Quadratus Lumborum Stretch   PTRx Ther Proc 2 - Details hold 30 seconds change hip angle as needed lower = more of a stretch   PTRx Ther Proc 3 Seated  "Hamstring Stretch   PTRx Ther Proc 3 - Details hold 20-30 seconds    PTRx Ther Proc 4 Seated Piriformis Stretch   PTRx Ther Proc 4 - Details Hold 30 seconds; Can lean chest forward with a flat back   PTRx Ther Proc 5 Standing Gastroc Stretch   PTRx Ther Proc 5 - Details hold 20-30 seconds   PTRx Ther Proc 6 Standing IT Band Stretch Using Wall   PTRx Ther Proc 6 - Details 20-30 sec   PTRx Ther Proc 7 Short Arc Knee Extension   PTRx Ther Proc 7 - Details 10x 5\".  Patient needs many cues to get to TKE and avoid hamstring stretch. reviewed today for form   PTRx Ther Proc 8 Knee Bends   PTRx Ther Proc 8 - Details 20x cues for positioning.   PTRx Ther Proc 9 Stepdown Backward   PTRx Ther Proc 9 - Details No Notes   PTRx Ther Proc 10 Bridging #1   PTRx Ther Proc 10 - Details 20x cues to keep feet even can put pillow between knees but too much pain with that today cues for glute squeeze.   PTRx Ther Proc 11 Hip Flexion Straight Leg Raise   PTRx Ther Proc 11 - Details 15x cues for TKE. Cues for slowly lowering back down.    Skilled Intervention cues for form   Patient Response/Progress tolerated OK today, no inc of symptoms   Ther Proc 2 leg press   Ther Proc 2 - Details sled set at 1, 4 pl double leg x 20 - increase weight next   Therapeutic Procedures Ther Proc 2   Education   Education Comments cueing that quad and glut muscle fatigue and soreness OK but not to pull into knee pain   Plan   Plan for next session assess tolerance to ex progression   Total Session Time   Timed Code Treatment Minutes 38   Total Treatment Time (sum of timed and untimed services) 38         DISCHARGE  Reason for Discharge: Patient has failed to schedule further appointments.    Equipment Issued: none    Discharge Plan: Patient to continue home program.    Referring Provider:  Luis Cummins    "